# Patient Record
Sex: FEMALE | URBAN - METROPOLITAN AREA
[De-identification: names, ages, dates, MRNs, and addresses within clinical notes are randomized per-mention and may not be internally consistent; named-entity substitution may affect disease eponyms.]

---

## 2018-08-23 ENCOUNTER — APPOINTMENT (RX ONLY)
Dept: URBAN - METROPOLITAN AREA CLINIC 127 | Facility: CLINIC | Age: 23
Setting detail: DERMATOLOGY
End: 2018-08-23

## 2018-08-23 DIAGNOSIS — L72.8 OTHER FOLLICULAR CYSTS OF THE SKIN AND SUBCUTANEOUS TISSUE: ICD-10-CM

## 2018-08-23 DIAGNOSIS — L70.8 OTHER ACNE: ICD-10-CM

## 2018-08-23 PROBLEM — L70.0 ACNE VULGARIS: Status: ACTIVE | Noted: 2018-08-23

## 2018-08-23 PROCEDURE — 99202 OFFICE O/P NEW SF 15 MIN: CPT

## 2018-08-23 PROCEDURE — ? COUNSELING

## 2018-08-23 PROCEDURE — ? PRESCRIPTION

## 2018-08-23 PROCEDURE — ? DIAGNOSIS COMMENT

## 2018-08-23 PROCEDURE — ? TREATMENT REGIMEN

## 2018-08-23 RX ORDER — DOXYCYCLINE HYCLATE 100 MG/1
CAPSULE, GELATIN COATED ORAL
Qty: 60 | Refills: 2 | Status: ERX | COMMUNITY
Start: 2018-08-23

## 2018-08-23 RX ORDER — TRETINOIN 0.25 MG/G
CREAM TOPICAL
Qty: 1 | Refills: 1 | Status: ERX | COMMUNITY
Start: 2018-08-23

## 2018-08-23 RX ADMIN — TRETINOIN: 0.25 CREAM TOPICAL at 12:34

## 2018-08-23 RX ADMIN — DOXYCYCLINE HYCLATE: 100 CAPSULE, GELATIN COATED ORAL at 12:34

## 2018-08-23 ASSESSMENT — LOCATION DETAILED DESCRIPTION DERM
LOCATION DETAILED: LEFT INFERIOR CENTRAL MALAR CHEEK
LOCATION DETAILED: RIGHT INFERIOR CENTRAL MALAR CHEEK

## 2018-08-23 ASSESSMENT — LOCATION ZONE DERM: LOCATION ZONE: FACE

## 2018-08-23 ASSESSMENT — LOCATION SIMPLE DESCRIPTION DERM
LOCATION SIMPLE: RIGHT CHEEK
LOCATION SIMPLE: LEFT CHEEK

## 2018-08-23 NOTE — PROCEDURE: TREATMENT REGIMEN
Plan: Pt is planning pregnancy. Pt will call once she is pregnant and will switch regimen from doxy/tret to clindamycin QAM + finacea QHS.  F/U in 3 months
Detail Level: Zone
Initiate Treatment: Wash with cetaphil \\nMoisturize with neutrogena oil free \\nDoxycycline 100mg  BID \\nTretinoin 0.025% cream pea size qhs

## 2018-08-23 NOTE — PROCEDURE: DIAGNOSIS COMMENT
Comment: Pt has hormonal based cystic acne but is not currently on any forms of birth control. She is actively planning pregnancy but would like to treat her acne aggressively prior to becoming pregnant. Discussed treating with oral abx and retinoids. Reviewed proper use and strongly advised to both discontinue current regimen and to call office the moment she becomes pregnant to switch to a pregnancy approved plan (clindamycin/finacea). Pt understood and will follow up in 3 months.  Consider treating with spironolactone post pregnancy
Detail Level: Simple